# Patient Record
Sex: MALE | Race: BLACK OR AFRICAN AMERICAN | Employment: OTHER | URBAN - METROPOLITAN AREA
[De-identification: names, ages, dates, MRNs, and addresses within clinical notes are randomized per-mention and may not be internally consistent; named-entity substitution may affect disease eponyms.]

---

## 2020-01-12 ENCOUNTER — HOSPITAL ENCOUNTER (EMERGENCY)
Age: 65
Discharge: HOME | End: 2020-01-13
Attending: EMERGENCY MEDICINE
Payer: COMMERCIAL

## 2020-01-12 ENCOUNTER — APPOINTMENT (OUTPATIENT)
Dept: CT IMAGING | Age: 65
End: 2020-01-12
Payer: COMMERCIAL

## 2020-01-12 LAB
A/G RATIO: 1.5 (ref 1.1–2.2)
ALBUMIN SERPL-MCNC: 3.7 G/DL (ref 3.4–5)
ALP BLD-CCNC: 60 U/L (ref 40–129)
ALT SERPL-CCNC: 11 U/L (ref 10–40)
ANION GAP SERPL CALCULATED.3IONS-SCNC: 11 MMOL/L (ref 3–16)
AST SERPL-CCNC: 16 U/L (ref 15–37)
BASOPHILS ABSOLUTE: 0 K/UL (ref 0–0.2)
BASOPHILS RELATIVE PERCENT: 0.7 %
BILIRUB SERPL-MCNC: <0.2 MG/DL (ref 0–1)
BUN BLDV-MCNC: 15 MG/DL (ref 7–20)
CALCIUM SERPL-MCNC: 8.9 MG/DL (ref 8.3–10.6)
CHLORIDE BLD-SCNC: 103 MMOL/L (ref 99–110)
CO2: 27 MMOL/L (ref 21–32)
CREAT SERPL-MCNC: 0.9 MG/DL (ref 0.8–1.3)
EOSINOPHILS ABSOLUTE: 0.1 K/UL (ref 0–0.6)
EOSINOPHILS RELATIVE PERCENT: 2.4 %
GFR AFRICAN AMERICAN: >60
GFR NON-AFRICAN AMERICAN: >60
GLOBULIN: 2.5 G/DL
GLUCOSE BLD-MCNC: 87 MG/DL (ref 70–99)
HCT VFR BLD CALC: 43.4 % (ref 40.5–52.5)
HEMOGLOBIN: 14.1 G/DL (ref 13.5–17.5)
LIPASE: 26 U/L (ref 13–60)
LYMPHOCYTES ABSOLUTE: 1.7 K/UL (ref 1–5.1)
LYMPHOCYTES RELATIVE PERCENT: 33 %
MCH RBC QN AUTO: 27.8 PG (ref 26–34)
MCHC RBC AUTO-ENTMCNC: 32.6 G/DL (ref 31–36)
MCV RBC AUTO: 85.4 FL (ref 80–100)
MONOCYTES ABSOLUTE: 0.7 K/UL (ref 0–1.3)
MONOCYTES RELATIVE PERCENT: 14 %
NEUTROPHILS ABSOLUTE: 2.5 K/UL (ref 1.7–7.7)
NEUTROPHILS RELATIVE PERCENT: 49.9 %
PDW BLD-RTO: 14.2 % (ref 12.4–15.4)
PLATELET # BLD: 164 K/UL (ref 135–450)
PMV BLD AUTO: 7.4 FL (ref 5–10.5)
POTASSIUM REFLEX MAGNESIUM: 3.8 MMOL/L (ref 3.5–5.1)
RBC # BLD: 5.08 M/UL (ref 4.2–5.9)
SODIUM BLD-SCNC: 141 MMOL/L (ref 136–145)
TOTAL PROTEIN: 6.2 G/DL (ref 6.4–8.2)
WBC # BLD: 5.1 K/UL (ref 4–11)

## 2020-01-12 PROCEDURE — 87086 URINE CULTURE/COLONY COUNT: CPT

## 2020-01-12 PROCEDURE — 85025 COMPLETE CBC W/AUTO DIFF WBC: CPT

## 2020-01-12 PROCEDURE — 6360000004 HC RX CONTRAST MEDICATION: Performed by: EMERGENCY MEDICINE

## 2020-01-12 PROCEDURE — 36415 COLL VENOUS BLD VENIPUNCTURE: CPT

## 2020-01-12 PROCEDURE — 81001 URINALYSIS AUTO W/SCOPE: CPT

## 2020-01-12 PROCEDURE — 99284 EMERGENCY DEPT VISIT MOD MDM: CPT

## 2020-01-12 PROCEDURE — 83690 ASSAY OF LIPASE: CPT

## 2020-01-12 PROCEDURE — 80053 COMPREHEN METABOLIC PANEL: CPT

## 2020-01-12 PROCEDURE — 74177 CT ABD & PELVIS W/CONTRAST: CPT

## 2020-01-12 RX ADMIN — IOPAMIDOL 75 ML: 755 INJECTION, SOLUTION INTRAVENOUS at 23:07

## 2020-01-12 ASSESSMENT — ENCOUNTER SYMPTOMS
NAUSEA: 0
CONSTIPATION: 0
SHORTNESS OF BREATH: 0
ABDOMINAL PAIN: 1
BACK PAIN: 0
COLOR CHANGE: 0
DIARRHEA: 0
VOMITING: 0
ABDOMINAL DISTENTION: 0
BLOOD IN STOOL: 0

## 2020-01-12 ASSESSMENT — PAIN SCALES - GENERAL: PAINLEVEL_OUTOF10: 3

## 2020-01-13 VITALS
BODY MASS INDEX: 28.2 KG/M2 | TEMPERATURE: 98.2 F | SYSTOLIC BLOOD PRESSURE: 128 MMHG | OXYGEN SATURATION: 97 % | HEIGHT: 68 IN | WEIGHT: 186.07 LBS | HEART RATE: 85 BPM | DIASTOLIC BLOOD PRESSURE: 66 MMHG | RESPIRATION RATE: 18 BRPM

## 2020-01-13 LAB
BILIRUBIN URINE: NEGATIVE
BLOOD, URINE: ABNORMAL
CLARITY: CLEAR
COLOR: YELLOW
EPITHELIAL CELLS, UA: 4 /HPF (ref 0–5)
GLUCOSE URINE: NEGATIVE MG/DL
HYALINE CASTS: 1 /LPF (ref 0–8)
KETONES, URINE: NEGATIVE MG/DL
LEUKOCYTE ESTERASE, URINE: ABNORMAL
MICROSCOPIC EXAMINATION: YES
NITRITE, URINE: NEGATIVE
PH UA: 6 (ref 5–8)
PROTEIN UA: NEGATIVE MG/DL
RBC UA: 5 /HPF (ref 0–4)
SPECIFIC GRAVITY UA: 1.02 (ref 1–1.03)
URINE REFLEX TO CULTURE: YES
URINE TYPE: ABNORMAL
UROBILINOGEN, URINE: 0.2 E.U./DL
WBC UA: 55 /HPF (ref 0–5)

## 2020-01-13 PROCEDURE — 6370000000 HC RX 637 (ALT 250 FOR IP): Performed by: NURSE PRACTITIONER

## 2020-01-13 RX ORDER — CEFUROXIME AXETIL 250 MG/1
250 TABLET ORAL 2 TIMES DAILY
Qty: 14 TABLET | Refills: 0 | Status: SHIPPED | OUTPATIENT
Start: 2020-01-13 | End: 2020-01-20

## 2020-01-13 RX ORDER — CEFUROXIME AXETIL 250 MG/1
250 TABLET ORAL ONCE
Status: COMPLETED | OUTPATIENT
Start: 2020-01-13 | End: 2020-01-13

## 2020-01-13 RX ADMIN — CEFUROXIME AXETIL 250 MG: 250 TABLET ORAL at 01:05

## 2020-01-13 NOTE — ED PROVIDER NOTES
Sensation is intact. Motor: Motor function is intact. Deep Tendon Reflexes:      Reflex Scores:       Patellar reflexes are 2+ on the right side and 2+ on the left side. Psychiatric:         Mood and Affect: Mood normal.         MEDICAL DECISION MAKING    Vitals:    Vitals:    01/12/20 2052   BP: 127/83   Pulse: 64   Resp: 20   Temp: 98.4 °F (36.9 °C)   TempSrc: Oral   SpO2: 99%   Weight: 186 lb 1.1 oz (84.4 kg)   Height: 5' 8\" (1.727 m)       LABS:  Labs Reviewed   URINE RT REFLEX TO CULTURE - Abnormal; Notable for the following components:       Result Value    Blood, Urine SMALL (*)     Leukocyte Esterase, Urine MODERATE (*)     All other components within normal limits    Narrative:     Performed at:  [address] [facility] [address]   Phone [phone]   COMPREHENSIVE METABOLIC PANEL W/ REFLEX TO MG FOR LOW K - Abnormal; Notable for the following components: Total Protein 6.2 (*)     All other components within normal limits    Narrative:     Performed at:  [address] [facility] [address]   Phone [phone]   MICROSCOPIC URINALYSIS - Abnormal; Notable for the following components:    WBC, UA 55 (*)     RBC, UA 5 (*)     All other components within normal limits    Narrative:     Performed at:  [address] [facility] [address]   Phone [phone]   URINE CULTURE   CBC WITH AUTO DIFFERENTIAL    Narrative:     Performed at:  [address] [facility] [address]   Phone [phone]   LIPASE    Narrative:     Performed at:  [facility]  [address] [place] [address] [facility] [address]   Phone [phone] of labs reviewed and werenegative at this time or not returned at the time of this note.     RADIOLOGY:   Non-plain film images such as CT, Ultrasound and MRI are read by the radiologist. Ana María REICH, EL Escobar CNP have directly visualized the radiologic plain film image(s) with the below findings:        Interpretation per the Radiologist below, if available at the time of this note:    CT ABDOMEN PELVIS W IV CONTRAST Additional Contrast? None   Preliminary Result   No evidence of bowel obstruction or abnormal bowel wall thickening. No acute   bowel abnormality. Evidence of prior superior mesenteric artery stents that are patent within   limits of this exam.      No acute abnormality in the abdomen or pelvis. No results found. MEDICAL DECISION MAKING / ED COURSE:      PROCEDURES:   Procedures    None    Patient was given:  Medications   cefUROXime (CEFTIN) tablet 250 mg (has no administration in time range)   iopamidol (ISOVUE-370) 76 % injection 75 mL (75 mLs Intravenous Given 1/12/20 2307)       Differential diagnosis: Abdominal Aortic Aneurysm, Ischemic Bowel, Bowel Obstruction, Appendicitis, Diverticulitis, Pyelonephritis, UTI, STD, Ureterolithiasis, Colitis, Gonad Torsion, other    Patient seen and examined today for suprapubic abdominal pain 3-week history physical exam as above. 71year-old lying in bed in no acute distress. No abdominal tenderness. Abdomen soft without rigidity guarding or peritoneal signs. Urine shows moderate leukocytes with 55 WBCs. .  See HPI for patient presentation. Patient is hemodynamically stable, nontoxic, afebrile, and without tachycardia, tachypnea, and hypoxia. CBC and chemistry normal.  CT abdomen pelvis shows no acute abnormality. Patient treated with Ceftin. He looks well. His abdomen is benign. He is not febrile or tachycardic. He was discharged home with Ceftin in stable condition. At this time, the evidence for any other entities in the differential is insufficient to justify any further testing. This was explained to the patient.   The patient was advised Paul Dykes, EL - CNP,           Sasha Jonas, EL - CNP  01/13/20 3473

## 2020-01-14 LAB
ORGANISM: ABNORMAL
URINE CULTURE, ROUTINE: ABNORMAL

## 2021-05-09 ENCOUNTER — HOSPITAL ENCOUNTER (EMERGENCY)
Age: 66
Discharge: HOME | End: 2021-05-09
Attending: EMERGENCY MEDICINE
Payer: OTHER GOVERNMENT

## 2021-05-09 ENCOUNTER — APPOINTMENT (OUTPATIENT)
Dept: CT IMAGING | Age: 66
End: 2021-05-09
Payer: OTHER GOVERNMENT

## 2021-05-09 VITALS
BODY MASS INDEX: 35.92 KG/M2 | DIASTOLIC BLOOD PRESSURE: 99 MMHG | HEIGHT: 67 IN | SYSTOLIC BLOOD PRESSURE: 149 MMHG | WEIGHT: 228.84 LBS | RESPIRATION RATE: 14 BRPM | TEMPERATURE: 98.1 F | OXYGEN SATURATION: 99 % | HEART RATE: 81 BPM

## 2021-05-09 DIAGNOSIS — R10.9 ABDOMINAL PAIN, UNSPECIFIED ABDOMINAL LOCATION: Primary | ICD-10-CM

## 2021-05-09 DIAGNOSIS — R11.2 NON-INTRACTABLE VOMITING WITH NAUSEA, UNSPECIFIED VOMITING TYPE: ICD-10-CM

## 2021-05-09 LAB
ALBUMIN SERPL-MCNC: 4.5 G/DL (ref 3.4–5)
ALP BLD-CCNC: 87 U/L (ref 40–129)
ALT SERPL-CCNC: 22 U/L (ref 10–40)
AMPHETAMINE SCREEN, URINE: ABNORMAL
ANION GAP SERPL CALCULATED.3IONS-SCNC: 15 MMOL/L (ref 3–16)
AST SERPL-CCNC: 39 U/L (ref 15–37)
BARBITURATE SCREEN URINE: ABNORMAL
BASOPHILS ABSOLUTE: 0 K/UL (ref 0–0.2)
BASOPHILS RELATIVE PERCENT: 0.5 %
BENZODIAZEPINE SCREEN, URINE: ABNORMAL
BILIRUB SERPL-MCNC: 0.5 MG/DL (ref 0–1)
BILIRUBIN DIRECT: <0.2 MG/DL (ref 0–0.3)
BILIRUBIN URINE: ABNORMAL
BILIRUBIN, INDIRECT: ABNORMAL MG/DL (ref 0–1)
BLOOD, URINE: ABNORMAL
BUN BLDV-MCNC: 13 MG/DL (ref 7–20)
CALCIUM SERPL-MCNC: 9.3 MG/DL (ref 8.3–10.6)
CANNABINOID SCREEN URINE: POSITIVE
CHLORIDE BLD-SCNC: 103 MMOL/L (ref 99–110)
CLARITY: CLEAR
CO2: 25 MMOL/L (ref 21–32)
COCAINE METABOLITE SCREEN URINE: ABNORMAL
COLOR: ABNORMAL
CREAT SERPL-MCNC: 1.1 MG/DL (ref 0.8–1.3)
EOSINOPHILS ABSOLUTE: 0.2 K/UL (ref 0–0.6)
EOSINOPHILS RELATIVE PERCENT: 2.6 %
EPITHELIAL CELLS, UA: 2 /HPF (ref 0–5)
ETHANOL: NORMAL MG/DL (ref 0–0.08)
GFR AFRICAN AMERICAN: >60
GFR NON-AFRICAN AMERICAN: >60
GLUCOSE BLD-MCNC: 62 MG/DL (ref 70–99)
GLUCOSE URINE: NEGATIVE MG/DL
HCT VFR BLD CALC: 50.8 % (ref 40.5–52.5)
HEMOGLOBIN: 16.7 G/DL (ref 13.5–17.5)
HYALINE CASTS: 1 /LPF (ref 0–8)
KETONES, URINE: >=80 MG/DL
LACTIC ACID: 1.2 MMOL/L (ref 0.4–2)
LEUKOCYTE ESTERASE, URINE: NEGATIVE
LIPASE: 13 U/L (ref 13–60)
LYMPHOCYTES ABSOLUTE: 1.4 K/UL (ref 1–5.1)
LYMPHOCYTES RELATIVE PERCENT: 23.9 %
Lab: ABNORMAL
MCH RBC QN AUTO: 28 PG (ref 26–34)
MCHC RBC AUTO-ENTMCNC: 32.8 G/DL (ref 31–36)
MCV RBC AUTO: 85.5 FL (ref 80–100)
METHADONE SCREEN, URINE: ABNORMAL
MICROSCOPIC EXAMINATION: YES
MONOCYTES ABSOLUTE: 0.8 K/UL (ref 0–1.3)
MONOCYTES RELATIVE PERCENT: 14.3 %
NEUTROPHILS ABSOLUTE: 3.5 K/UL (ref 1.7–7.7)
NEUTROPHILS RELATIVE PERCENT: 58.7 %
NITRITE, URINE: NEGATIVE
OCCULT BLOOD DIAGNOSTIC: NORMAL
OPIATE SCREEN URINE: ABNORMAL
OXYCODONE URINE: ABNORMAL
PDW BLD-RTO: 15.4 % (ref 12.4–15.4)
PH UA: 5.5 (ref 5–8)
PH UA: 6
PHENCYCLIDINE SCREEN URINE: ABNORMAL
PLATELET # BLD: 157 K/UL (ref 135–450)
PMV BLD AUTO: 7.9 FL (ref 5–10.5)
POTASSIUM REFLEX MAGNESIUM: 3.8 MMOL/L (ref 3.5–5.1)
PROPOXYPHENE SCREEN: ABNORMAL
PROTEIN UA: 30 MG/DL
RBC # BLD: 5.94 M/UL (ref 4.2–5.9)
RBC UA: 7 /HPF (ref 0–4)
SODIUM BLD-SCNC: 143 MMOL/L (ref 136–145)
SPECIFIC GRAVITY UA: 1.02 (ref 1–1.03)
TOTAL PROTEIN: 7.5 G/DL (ref 6.4–8.2)
URINE REFLEX TO CULTURE: ABNORMAL
URINE TYPE: ABNORMAL
UROBILINOGEN, URINE: 1 E.U./DL
WBC # BLD: 5.9 K/UL (ref 4–11)
WBC UA: 1 /HPF (ref 0–5)

## 2021-05-09 PROCEDURE — 96374 THER/PROPH/DIAG INJ IV PUSH: CPT

## 2021-05-09 PROCEDURE — 82077 ASSAY SPEC XCP UR&BREATH IA: CPT

## 2021-05-09 PROCEDURE — 74174 CTA ABD&PLVS W/CONTRAST: CPT

## 2021-05-09 PROCEDURE — 2580000003 HC RX 258: Performed by: EMERGENCY MEDICINE

## 2021-05-09 PROCEDURE — 99283 EMERGENCY DEPT VISIT LOW MDM: CPT

## 2021-05-09 PROCEDURE — 80307 DRUG TEST PRSMV CHEM ANLYZR: CPT

## 2021-05-09 PROCEDURE — 80048 BASIC METABOLIC PNL TOTAL CA: CPT

## 2021-05-09 PROCEDURE — 6360000004 HC RX CONTRAST MEDICATION: Performed by: EMERGENCY MEDICINE

## 2021-05-09 PROCEDURE — 6360000002 HC RX W HCPCS: Performed by: EMERGENCY MEDICINE

## 2021-05-09 PROCEDURE — 80076 HEPATIC FUNCTION PANEL: CPT

## 2021-05-09 PROCEDURE — 81001 URINALYSIS AUTO W/SCOPE: CPT

## 2021-05-09 PROCEDURE — G0328 FECAL BLOOD SCRN IMMUNOASSAY: HCPCS

## 2021-05-09 PROCEDURE — 83690 ASSAY OF LIPASE: CPT

## 2021-05-09 PROCEDURE — 96372 THER/PROPH/DIAG INJ SC/IM: CPT

## 2021-05-09 PROCEDURE — 83605 ASSAY OF LACTIC ACID: CPT

## 2021-05-09 PROCEDURE — 2500000003 HC RX 250 WO HCPCS: Performed by: EMERGENCY MEDICINE

## 2021-05-09 PROCEDURE — 85025 COMPLETE CBC W/AUTO DIFF WBC: CPT

## 2021-05-09 RX ORDER — LIDOCAINE HYDROCHLORIDE 10 MG/ML
0.9 INJECTION, SOLUTION EPIDURAL; INFILTRATION; INTRACAUDAL; PERINEURAL ONCE
Status: COMPLETED | OUTPATIENT
Start: 2021-05-09 | End: 2021-05-09

## 2021-05-09 RX ORDER — SODIUM CHLORIDE 9 MG/ML
1000 INJECTION, SOLUTION INTRAVENOUS CONTINUOUS
Status: DISCONTINUED | OUTPATIENT
Start: 2021-05-09 | End: 2021-05-09 | Stop reason: HOSPADM

## 2021-05-09 RX ORDER — OMEPRAZOLE 40 MG/1
40 CAPSULE, DELAYED RELEASE ORAL
Qty: 30 CAPSULE | Refills: 0 | Status: SHIPPED | OUTPATIENT
Start: 2021-05-09

## 2021-05-09 RX ORDER — ONDANSETRON 2 MG/ML
4 INJECTION INTRAMUSCULAR; INTRAVENOUS ONCE
Status: COMPLETED | OUTPATIENT
Start: 2021-05-09 | End: 2021-05-09

## 2021-05-09 RX ORDER — DOXYCYCLINE HYCLATE 100 MG/1
100 CAPSULE ORAL 2 TIMES DAILY
Qty: 20 CAPSULE | Refills: 0 | Status: SHIPPED | OUTPATIENT
Start: 2021-05-09 | End: 2021-05-19

## 2021-05-09 RX ORDER — ONDANSETRON 4 MG/1
4 TABLET, ORALLY DISINTEGRATING ORAL 4 TIMES DAILY PRN
Qty: 30 TABLET | Refills: 1 | Status: SHIPPED | OUTPATIENT
Start: 2021-05-09

## 2021-05-09 RX ORDER — CEFTRIAXONE SODIUM 250 MG/1
250 INJECTION, POWDER, FOR SOLUTION INTRAMUSCULAR; INTRAVENOUS ONCE
Status: COMPLETED | OUTPATIENT
Start: 2021-05-09 | End: 2021-05-09

## 2021-05-09 RX ADMIN — SODIUM CHLORIDE, SODIUM LACTATE, POTASSIUM CHLORIDE, CALCIUM CHLORIDE AND DEXTROSE MONOHYDRATE 1000 ML: 5; 600; 310; 30; 20 INJECTION, SOLUTION INTRAVENOUS at 11:27

## 2021-05-09 RX ADMIN — ONDANSETRON 4 MG: 2 INJECTION INTRAMUSCULAR; INTRAVENOUS at 09:03

## 2021-05-09 RX ADMIN — IOPAMIDOL 75 ML: 755 INJECTION, SOLUTION INTRAVENOUS at 10:12

## 2021-05-09 RX ADMIN — CEFTRIAXONE SODIUM 250 MG: 250 INJECTION, POWDER, FOR SOLUTION INTRAMUSCULAR; INTRAVENOUS at 14:51

## 2021-05-09 RX ADMIN — SODIUM CHLORIDE 1000 ML: 9 INJECTION, SOLUTION INTRAVENOUS at 09:03

## 2021-05-09 RX ADMIN — LIDOCAINE HYDROCHLORIDE 0.9 ML: 10 INJECTION, SOLUTION EPIDURAL; INFILTRATION; INTRACAUDAL; PERINEURAL at 14:51

## 2021-05-09 ASSESSMENT — ENCOUNTER SYMPTOMS
RHINORRHEA: 0
DIARRHEA: 0
SHORTNESS OF BREATH: 1
VOMITING: 1
EYE PAIN: 0
WHEEZING: 0
EYE DISCHARGE: 0
BACK PAIN: 0
COUGH: 1
SORE THROAT: 0
ABDOMINAL PAIN: 1
BLOOD IN STOOL: 1
EYE REDNESS: 0
NAUSEA: 1

## 2021-05-09 NOTE — ED PROVIDER NOTES
for appetite change. Negative for chills, fatigue and fever. HENT: Negative for ear pain, rhinorrhea and sore throat. Eyes: Negative for pain, discharge, redness and visual disturbance. Respiratory: Positive for cough and shortness of breath. Negative for wheezing. Cardiovascular: Negative for chest pain, palpitations and leg swelling. Gastrointestinal: Positive for abdominal pain, blood in stool, nausea and vomiting. Negative for diarrhea. Patient also has a chronic right inguinal hernia. Genitourinary: Negative for difficulty urinating and dysuria. Musculoskeletal: Negative for arthralgias, back pain and myalgias. Skin: Negative for rash. Allergic/Immunologic: Negative for environmental allergies. Neurological: Negative for dizziness, seizures, syncope and headaches. Hematological: Negative for adenopathy. Psychiatric/Behavioral: Negative for suicidal ideas. The patient is not nervous/anxious. PAST MEDICAL HISTORY     Past Medical History:   Diagnosis Date    Arthritis     degenerative    Hypertension     Neuromuscular disorder (HCC)     numbness left foot    PTSD (post-traumatic stress disorder)     [facility]     Social anxiety disorder          SURGICAL HISTORY     Past Surgical History:   Procedure Laterality Date    EYE SURGERY Right          CURRENTMEDICATIONS       Previous Medications    ASPIRIN 81 MG CHEWABLE TABLET    Take 1 tablet by mouth daily. CLOPIDOGREL (PLAVIX) 75 MG TABLET    Take 1 tablet by mouth daily. LISINOPRIL-HYDROCHLOROTHIAZIDE (PRINZIDE;ZESTORETIC) 20-12.5 MG PER TABLET    Take 1 tablet by mouth daily. ALLERGIES     Patient has no known allergies. FAMILY HISTORY     History reviewed. No pertinent family history.        SOCIAL HISTORY       Social History     Socioeconomic History    Marital status: Single     Spouse name: None    Number of children: None    Years of education: None    Highest education level: None   Occupational History    None   Social Needs    Financial resource strain: None    Food insecurity     Worry: None     Inability: None    Transportation needs     Medical: None     Non-medical: None   Tobacco Use    Smoking status: Current Every Day Smoker     Packs/day: 1.00     Years: 14.00     Pack years: 14.00     Types: Cigarettes   Substance and Sexual Activity    Alcohol use: Yes     Comment: rarely, but had alcohol on Wednesday.  Drug use: Yes     Types: Cocaine     Comment: used Cocaine last week.  Sexual activity: None   Lifestyle    Physical activity     Days per week: None     Minutes per session: None    Stress: None   Relationships    Social connections     Talks on phone: None     Gets together: None     Attends Faith service: None     Active member of club or organization: None     Attends meetings of clubs or organizations: None     Relationship status: None    Intimate partner violence     Fear of current or ex partner: None     Emotionally abused: None     Physically abused: None     Forced sexual activity: None   Other Topics Concern    None   Social History Narrative    None       SCREENINGS             PHYSICAL EXAM    (up to 7 for level 4, 8 or more for level 5)     ED Triage Vitals [05/09/21 0818]   BP Temp Temp Source Pulse Resp SpO2 Height Weight   (!) 149/106 98.1 °F (36.7 °C) Oral 105 12 96 % 5' 7\" (1.702 m) 228 lb 13.4 oz (103.8 kg)      height is 5' 7\" (1.702 m) and weight is 228 lb 13.4 oz (103.8 kg). His oral temperature is 98.1 °F (36.7 °C). His blood pressure is 149/99 (abnormal) and his pulse is 81. His respiration is 14 and oxygen saturation is 99%. Physical Exam  Constitutional:       Appearance: He is well-developed. He is not diaphoretic. HENT:      Head: Normocephalic and atraumatic. Right Ear: External ear normal.      Left Ear: External ear normal.   Eyes:      General: No scleral icterus. Right eye: No discharge.          Left eye: No discharge. Neck:      Musculoskeletal: Normal range of motion. Thyroid: No thyromegaly. Vascular: No JVD. Trachea: No tracheal deviation. Cardiovascular:      Rate and Rhythm: Normal rate and regular rhythm. Heart sounds: No murmur. No friction rub. No gallop. Pulmonary:      Effort: Pulmonary effort is normal. No respiratory distress. Breath sounds: Normal breath sounds. No stridor. No wheezing or rales. Abdominal:      General: There is no distension. Palpations: Abdomen is soft. Tenderness: There is abdominal tenderness in the periumbilical area. There is no guarding or rebound. Genitourinary:     Comments: Patient has a fairly large right scrotal hernia that is reducible. Rectal exam showed normal tone. Stool was yellow and Hemoccult came back negative from the lab. Musculoskeletal:         General: No tenderness. Right lower le+ Pitting Edema present. Left lower le+ Pitting Edema present. Skin:     General: Skin is warm and dry. Findings: No rash (On exposed body surfaces). Neurological:      Mental Status: He is alert and oriented to person, place, and time. Coordination: Coordination normal.   Psychiatric:         Attention and Perception: Attention normal.         Mood and Affect: Mood normal.         Speech: Speech is rapid and pressured. Behavior: Behavior normal.         Thought Content: Thought content normal.         Judgment: Judgment is inappropriate.          DIAGNOSTIC RESULTS   LABS:    Results for orders placed or performed during the hospital encounter of 21   CBC Auto Differential   Result Value Ref Range    WBC 5.9 4.0 - 11.0 K/uL    RBC 5.94 (H) 4.20 - 5.90 M/uL    Hemoglobin 16.7 13.5 - 17.5 g/dL    Hematocrit 50.8 40.5 - 52.5 %    MCV 85.5 80.0 - 100.0 fL    MCH 28.0 26.0 - 34.0 pg    MCHC 32.8 31.0 - 36.0 g/dL    RDW 15.4 12.4 - 15.4 %    Platelets 137 280 - 443 K/uL    MPV 7.9 5.0 - 10.5 fL    Neutrophils % 58.7 %    Lymphocytes % 23.9 %    Monocytes % 14.3 %    Eosinophils % 2.6 %    Basophils % 0.5 %    Neutrophils Absolute 3.5 1.7 - 7.7 K/uL    Lymphocytes Absolute 1.4 1.0 - 5.1 K/uL    Monocytes Absolute 0.8 0.0 - 1.3 K/uL    Eosinophils Absolute 0.2 0.0 - 0.6 K/uL    Basophils Absolute 0.0 0.0 - 0.2 K/uL   Basic Metabolic Panel w/ Reflex to MG   Result Value Ref Range    Sodium 143 136 - 145 mmol/L    Potassium reflex Magnesium 3.8 3.5 - 5.1 mmol/L    Chloride 103 99 - 110 mmol/L    CO2 25 21 - 32 mmol/L    Anion Gap 15 3 - 16    Glucose 62 (L) 70 - 99 mg/dL    BUN 13 7 - 20 mg/dL    CREATININE 1.1 0.8 - 1.3 mg/dL    GFR Non-African American >60 >60    GFR African American >60 >60    Calcium 9.3 8.3 - 10.6 mg/dL   Hepatic Function Panel   Result Value Ref Range    Total Protein 7.5 6.4 - 8.2 g/dL    Albumin 4.5 3.4 - 5.0 g/dL    Alkaline Phosphatase 87 40 - 129 U/L    ALT 22 10 - 40 U/L    AST 39 (H) 15 - 37 U/L    Total Bilirubin 0.5 0.0 - 1.0 mg/dL    Bilirubin, Direct <0.2 0.0 - 0.3 mg/dL    Bilirubin, Indirect see below 0.0 - 1.0 mg/dL   Lipase   Result Value Ref Range    Lipase 13.0 13.0 - 60.0 U/L   Lactic Acid, Plasma   Result Value Ref Range    Lactic Acid 1.2 0.4 - 2.0 mmol/L   Urinalysis Reflex to Culture    Specimen: Urine, clean catch   Result Value Ref Range    Color, UA DK YELLOW Straw/Yellow    Clarity, UA Clear Clear    Glucose, Ur Negative Negative mg/dL    Bilirubin Urine SMALL (A) Negative    Ketones, Urine >=80 (A) Negative mg/dL    Specific Gravity, UA 1.024 1.005 - 1.030    Blood, Urine LARGE (A) Negative    pH, UA 5.5 5.0 - 8.0    Protein, UA 30 (A) Negative mg/dL    Urobilinogen, Urine 1.0 <2.0 E.U./dL    Nitrite, Urine Negative Negative    Leukocyte Esterase, Urine Negative Negative    Microscopic Examination YES     Urine Type NotGiven     Urine Reflex to Culture Not Indicated    Blood Occult Stool #1   Result Value Ref Range    Occult Blood Diagnostic Result: Negative  Normal range: Negative      Ethanol   Result Value Ref Range    Ethanol Lvl None Detected mg/dL   Drug screen multi urine   Result Value Ref Range    Amphetamine Screen, Urine Neg Negative <1000ng/mL    Barbiturate Screen, Ur Neg Negative <200 ng/mL    Benzodiazepine Screen, Urine Neg Negative <200 ng/mL    Cannabinoid Scrn, Ur POSITIVE (A) Negative <50 ng/mL    Cocaine Metabolite Screen, Urine Neg Negative <300 ng/mL    Opiate Scrn, Ur Neg Negative <300 ng/mL    PCP Screen, Urine Neg Negative <25 ng/mL    Methadone Screen, Urine Neg Negative <300 ng/mL    Propoxyphene Scrn, Ur Neg Negative <300 ng/mL    Oxycodone Urine Neg Negative <100 ng/ml    pH, UA 6.0     Drug Screen Comment: see below    Microscopic Urinalysis   Result Value Ref Range    Hyaline Casts, UA 1 0 - 8 /LPF    WBC, UA 1 0 - 5 /HPF    RBC, UA 7 (H) 0 - 4 /HPF    Epithelial Cells, UA 2 0 - 5 /HPF       All other labs were within normal range or not returned as of this dictation. EKG: All EKG's are interpreted by the Emergency Department Physician who either signs orCo-signs this chart in the absence of a cardiologist.    None    RADIOLOGY:   Non-plain film images such as CT, Ultrasound and MRI are read by the radiologist. [facility] [place] radiographic images are visualized and preliminarily interpreted by the  EDProvider with the below findings:    Cta Chest Abdomen Pelvis W Contrast    Result Date: 5/9/2021  EXAMINATION: CTA OF THE CHEST, ABDOMEN AND PELVIS WITH CONTRAST 5/9/2021 10:11 am: TECHNIQUE: CTA of the chest, abdomen and pelvis was performed after the administration of intravenous contrast.  Multiplanar reformatted images are provided for review. MIP images are provided for review. Dose modulation, iterative reconstruction, and/or weight based adjustment of the mA/kV was utilized to reduce the radiation dose to as low as reasonably achievable. COMPARISON: January 12, 2020, March 1, 2015 CT of the abdomen and pelvis.   No comparison for the chest portion of the exam. HISTORY: ORDERING SYSTEM PROVIDED HISTORY: Cough hemoptysis abdominal pain nausea and vomiting. History of SMA stenting and previous mesenteric ischemia TECHNOLOGIST PROVIDED HISTORY: Reason for exam:->Cough hemoptysis abdominal pain nausea and vomiting. History of SMA stenting and previous mesenteric ischemia Decision Support Exception - unselect if not a suspected or confirmed emergency medical condition->Emergency Medical Condition (MA) Reason for Exam: Cough hemoptysis abdominal pain nausea and vomiting. History of SMA stenting and previous mesenteric ischemia Acuity: Chronic Type of Exam: Ongoing FINDINGS: CTA CHEST: Thoracic aorta: Thoracic aorta opacifies normally and is of normal caliber. Mediastinum: Heart size is normal.  No pericardial effusion. No hilar or mediastinal adenopathy. There are coarsely calcified left hilar nodes and mediastinal nodes. Lungs/Pleura: Bibasilar dependent atelectasis. The central tracheal bronchial airways are normal. There is no bronchiectasis or bronchial wall thickening. There is no focal pulmonary consolidation, pneumothorax, or pleural effusion. Soft Tissues/Bones: No aggressive lytic or blastic bony lesion. CTA ABDOMEN: Abdominal aorta/Branches: Abdominal aorta opacifies normally. Celiac trunk opacifies normally. There are 2 stents in the SMA. SMA opacifies normally to the secondary branches. Organs: Hepatic steatosis. Gallbladder, spleen, pancreas, and adrenal glands are unremarkable. Kidneys enhance symmetrically. No hydronephrosis. GI/Bowel: Cecal and small bowel containing right inguinal hernia with a small amount of fluid in the hernia sac. No appreciable bowel wall thickening or evidence of bowel obstruction. Sigmoid diverticulosis. No evidence of free intraperitoneal air. Peritoneum/Retroperitoneum: No retroperitoneal hematoma or adenopathy. Bones/Soft Tissues: No aggressive lytic or blastic bony lesion.   Mild L5-S1 degenerative disc disease. CTA PELVIS: Aorta/Iliacs: Common iliac arteries, internal iliac arteries, and external iliac arteries opacify normally. Other: Bladder is unremarkable. Prostate is enlarged. Calcifications in the pelvis are phleboliths. No free fluid in the pelvis. Bones/Soft Tissues: No aggressive lytic or blastic bony lesion. Severe bilateral hip degenerative changes with bone-on-bone articulation and flattening of the femoral head articular surface. 1. No evidence of thoracic aortic aneurysm or dissection. 2. Normal opacification of the major side branches of the aorta. There are 2 stents in the SMA. No visible thrombus to the secondary branches. 3. Cecum and small bowel containing right inguinal hernia with small amount of fluid in the hernia sac. There is no associated bowel wall thickening or evidence of bowel obstruction. 4. Hepatic steatosis. 5. Severe bilateral hip degenerative changes with bone-on-bone articulation and flattening of the femoral head articular surface. 6. Right base pulmonary atelectasis. No other acute pulmonary finding to account for patient's hemoptysis.            PROCEDURES   Unless otherwise noted below, none     Procedures    CRITICAL CARE TIME   N/A    CONSULTS:  None    EMERGENCY DEPARTMENT COURSE and DIFFERENTIAL DIAGNOSIS/MDM:   Vitals:    Vitals:    05/09/21 0930 05/09/21 0945 05/09/21 1115 05/09/21 1315   BP: (!) 168/108 (!) 156/119 (!) 156/108 (!) 149/99   Pulse: 87  82 81   Resp: 14  16 14   Temp:       TempSrc:       SpO2: 99%  98% 99%   Weight:       Height:           Patient was given the following medications:  Medications   0.9 % sodium chloride infusion (0 mLs Intravenous Stopped 5/9/21 1117)   cefTRIAXone (ROCEPHIN) injection 250 mg (has no administration in time range)     And   lidocaine PF 1 % injection 0.9 mL (has no administration in time range)   ondansetron (ZOFRAN) injection 4 mg (4 mg Intravenous Given 5/9/21 0903)   iopamidol (ISOVUE-370) 76 % injection 75 mL (75 mLs Intravenous Given 5/9/21 1012)   dextrose 5 % in lactated ringers bolus (0 mLs Intravenous Stopped 5/9/21 1318)       There is actually several reasons for this patient to have gastrointestinal symptoms other than his history of mesenteric ischemia. First of course is the fact that he is a daily drinker. Second also is that he is a daily user of marijuana. Lastly he has been on Flagyl twice and he continues to drink so there is also the possibility of that disulfiram-like reaction associated with Flagyl and alcohol use simultaneously. Regardless we gave him 2 L of fluid one of them being D5 Ringer's lactate. Although his BUN and creatinine were within normal range she did have a lot of ketones in the urine. He was given Zofran. We then fed the patient and he kept that down and feels a lot better. He insists he wants a shot of penicillin for his trichomonas. I explained to him that that is not the treatment for trichomonas and then all this is started to come together with with multiple possibilities and etiologies as to his gastrointestinal symptoms that are unrelated to ischemia. Going to put the patient on a course of doxycycline and will give him a shot of Rocephin here to cover gonorrhea and chlamydia. At the moment is probably best that we hold off on giving him additional metronidazole. He states he has had 2 courses of it. FINAL IMPRESSION      1. Abdominal pain, unspecified abdominal location    2.  Non-intractable vomiting with nausea, unspecified vomiting type          DISPOSITION/PLAN    DISPOSITION Discharge - Pending Orders Complete 05/09/2021 12:12:49 PM      PATIENT REFERRED TO:  Katherine Carroll MD  [address] [place] [address]  [phone]            DISCHARGE MEDICATIONS:  New Prescriptions    DOXYCYCLINE HYCLATE (VIBRAMYCIN) 100 MG CAPSULE    Take 1 capsule by mouth 2 times daily for 10 days    OMEPRAZOLE (PRILOSEC) 40 MG DELAYED

## 2021-05-09 NOTE — ED NOTES
Patient given turkey sandwich and pepsi. No c/o nausea at this time. Call light within reach, jessy any further needs at this time.      Cheyanne Garcia, NATALYA  05/09/21 4313

## 2022-03-01 ENCOUNTER — HOSPITAL ENCOUNTER (EMERGENCY)
Age: 67
Discharge: HOME | End: 2022-03-01
Payer: COMMERCIAL

## 2022-03-01 VITALS
TEMPERATURE: 98.78 F | HEART RATE: 81 BPM | DIASTOLIC BLOOD PRESSURE: 94 MMHG | RESPIRATION RATE: 18 BRPM | OXYGEN SATURATION: 98 % | SYSTOLIC BLOOD PRESSURE: 168 MMHG

## 2022-03-01 VITALS
RESPIRATION RATE: 18 BRPM | SYSTOLIC BLOOD PRESSURE: 165 MMHG | DIASTOLIC BLOOD PRESSURE: 60 MMHG | HEART RATE: 78 BPM | OXYGEN SATURATION: 97 %

## 2022-03-01 VITALS — BODY MASS INDEX: 31.3 KG/M2

## 2022-03-01 DIAGNOSIS — I10: ICD-10-CM

## 2022-03-01 DIAGNOSIS — F17.210: ICD-10-CM

## 2022-03-01 DIAGNOSIS — F14.10: ICD-10-CM

## 2022-03-01 DIAGNOSIS — Z86.16: ICD-10-CM

## 2022-03-01 DIAGNOSIS — R60.0: ICD-10-CM

## 2022-03-01 DIAGNOSIS — M79.10: Primary | ICD-10-CM

## 2022-03-01 DIAGNOSIS — Z79.899: ICD-10-CM

## 2022-03-01 LAB
ALANINE AMINOTRANSFER ALT/SGPT: 14 U/L (ref 16–61)
ALBUMIN SERPL-MCNC: 3.1 G/DL (ref 3.2–5)
ALCOHOL, BLOOD (MEDICAL)-SERUM: < 3 MG/DL
ALKALINE PHOSPHATASE: 78 U/L (ref 45–117)
AMPHET UR-MCNC: NEGATIVE NG/ML
ANION GAP: 6 (ref 5–15)
AST(SGOT): 17 U/L (ref 15–37)
BARBITURATE URINE VISTA: NEGATIVE
BENZODIAZEPINE URINE VISTA: NEGATIVE
BUN SERPL-MCNC: 14 MG/DL (ref 7–18)
BUN/CREAT RATIO: 12.5 RATIO (ref 10–20)
CALCIUM SERPL-MCNC: 8.8 MG/DL (ref 8.5–10.1)
CARBON DIOXIDE: 29 MMOL/L (ref 21–32)
CHLORIDE: 108 MMOL/L (ref 98–107)
COCAINE URINE VISTA: POSITIVE
DEPRECATED RDW RBC: 45.9 FL (ref 35.1–43.9)
DRUG CONFIRMATION TO FOLLOW?: (no result)
ECSTACY URINE VISTA: NEGATIVE
ERYTHROCYTE [DISTWIDTH] IN BLOOD: 14.2 % (ref 11.6–14.6)
EST GLOM FILT RATE - AFR AMER: 84 ML/MIN (ref 60–?)
ESTIMATED CREATININE CLEARANCE: 59.84 ML/MIN
GLOBULIN: 2.9 G/DL (ref 2.2–4.2)
GLUCOSE: 96 MG/DL (ref 74–106)
HCT VFR BLD AUTO: 41.6 % (ref 40–54)
HEMOGLOBIN: 13.4 G/DL (ref 13–16.5)
HGB BLD-MCNC: 13.4 G/DL (ref 13–16.5)
IMMATURE GRANULOCYTES COUNT: 0.01 X10^3/UL (ref 0–0)
KETONE-DIPSTICK: 5 MG/DL
LEUKOCYTE ESTERASE UR QL STRIP: 25 /UL
MCV RBC: 87 FL (ref 80–94)
MEAN CORP HGB CONC: 32.2 G/DL (ref 32–36)
MEAN PLATELET VOL.: 9.6 FL (ref 6.2–12)
METHADONE URINE VISTA: NEGATIVE
MUCOUS THREADS URNS QL MICRO: (no result) /HPF
NRBC FLAGGED BY ANALYZER: 0 % (ref 0–5)
PCP UR QL: NEGATIVE
PH UR: 5 [PH]
PLATELET # BLD: 157 K/MM3 (ref 150–450)
PLATELET COUNT: 157 K/MM3 (ref 150–450)
POTASSIUM: 3.6 MMOL/L (ref 3.5–5.1)
PROT UR QL STRIP.AUTO: 30 MG/DL
RBC # BLD AUTO: 4.78 M/MM3 (ref 4.6–6.2)
RBC DISTRIBUTION WIDTH CV: 14.2 % (ref 11.6–14.6)
RBC DISTRIBUTION WIDTH SD: 45.9 FL (ref 35.1–43.9)
RBC UR QL: (no result) /HPF (ref 0–5)
RBC UR QL: 150 /UL
SP GR UR: 1.02 (ref 1–1.03)
SQUAMOUS URNS QL MICRO: (no result) /HPF (ref 0–5)
THC URINE VISTA: NEGATIVE
URINE PRESERVATIVE: (no result)
WBC # BLD AUTO: 5.2 K/MM3 (ref 4.4–11)
WHITE BLOOD COUNT: 5.2 K/MM3 (ref 4.4–11)

## 2022-03-01 PROCEDURE — 80307 DRUG TEST PRSMV CHEM ANLYZR: CPT

## 2022-03-01 PROCEDURE — 85025 COMPLETE CBC W/AUTO DIFF WBC: CPT

## 2022-03-01 PROCEDURE — 82077 ASSAY SPEC XCP UR&BREATH IA: CPT

## 2022-03-01 PROCEDURE — A4216 STERILE WATER/SALINE, 10 ML: HCPCS

## 2022-03-01 PROCEDURE — 96361 HYDRATE IV INFUSION ADD-ON: CPT

## 2022-03-01 PROCEDURE — 99285 EMERGENCY DEPT VISIT HI MDM: CPT

## 2022-03-01 PROCEDURE — 96374 THER/PROPH/DIAG INJ IV PUSH: CPT

## 2022-03-01 PROCEDURE — 87426 SARSCOV CORONAVIRUS AG IA: CPT

## 2022-03-01 PROCEDURE — 81001 URINALYSIS AUTO W/SCOPE: CPT

## 2022-03-01 PROCEDURE — 80053 COMPREHEN METABOLIC PANEL: CPT

## 2022-03-02 ENCOUNTER — APPOINTMENT (OUTPATIENT)
Dept: GENERAL RADIOLOGY | Age: 67
End: 2022-03-02
Payer: OTHER GOVERNMENT

## 2022-03-02 ENCOUNTER — HOSPITAL ENCOUNTER (EMERGENCY)
Age: 67
Discharge: HOME | End: 2022-03-02
Attending: EMERGENCY MEDICINE
Payer: OTHER GOVERNMENT

## 2022-03-02 VITALS
TEMPERATURE: 98 F | OXYGEN SATURATION: 99 % | RESPIRATION RATE: 18 BRPM | HEART RATE: 88 BPM | SYSTOLIC BLOOD PRESSURE: 170 MMHG | DIASTOLIC BLOOD PRESSURE: 105 MMHG

## 2022-03-02 VITALS
WEIGHT: 216.49 LBS | RESPIRATION RATE: 16 BRPM | TEMPERATURE: 98.1 F | HEART RATE: 70 BPM | SYSTOLIC BLOOD PRESSURE: 182 MMHG | OXYGEN SATURATION: 97 % | DIASTOLIC BLOOD PRESSURE: 101 MMHG | BODY MASS INDEX: 33.91 KG/M2

## 2022-03-02 DIAGNOSIS — M79.605 BILATERAL LEG PAIN: Primary | ICD-10-CM

## 2022-03-02 DIAGNOSIS — M79.604 BILATERAL LEG PAIN: Primary | ICD-10-CM

## 2022-03-02 DIAGNOSIS — F14.10 NONDEPENDENT COCAINE ABUSE (HCC): Primary | ICD-10-CM

## 2022-03-02 DIAGNOSIS — R31.9 HEMATURIA, UNSPECIFIED TYPE: ICD-10-CM

## 2022-03-02 LAB
ACETAMINOPHEN LEVEL: <5 UG/ML (ref 10–30)
AMPHETAMINE SCREEN, URINE: ABNORMAL
ANION GAP SERPL CALCULATED.3IONS-SCNC: 12 MMOL/L (ref 3–16)
BARBITURATE SCREEN URINE: ABNORMAL
BASOPHILS ABSOLUTE: 0 K/UL (ref 0–0.2)
BASOPHILS RELATIVE PERCENT: 0.6 %
BENZODIAZEPINE SCREEN, URINE: ABNORMAL
BILIRUBIN URINE: NEGATIVE
BILIRUBIN URINE: NEGATIVE
BLOOD, URINE: ABNORMAL
BLOOD, URINE: ABNORMAL
BUN BLDV-MCNC: 14 MG/DL (ref 7–20)
CALCIUM SERPL-MCNC: 8.6 MG/DL (ref 8.3–10.6)
CANNABINOID SCREEN URINE: ABNORMAL
CHLORIDE BLD-SCNC: 106 MMOL/L (ref 99–110)
CLARITY: CLEAR
CLARITY: CLEAR
CO2: 24 MMOL/L (ref 21–32)
COCAINE METABOLITE SCREEN URINE: POSITIVE
COLOR: YELLOW
COLOR: YELLOW
CREAT SERPL-MCNC: 1 MG/DL (ref 0.8–1.3)
EOSINOPHILS ABSOLUTE: 0.2 K/UL (ref 0–0.6)
EOSINOPHILS RELATIVE PERCENT: 4.8 %
EPITHELIAL CELLS, UA: 2 /HPF (ref 0–5)
EPITHELIAL CELLS, UA: 3 /HPF (ref 0–5)
ETHANOL: NORMAL MG/DL (ref 0–0.08)
GFR AFRICAN AMERICAN: >60
GFR NON-AFRICAN AMERICAN: >60
GLUCOSE BLD-MCNC: 150 MG/DL (ref 70–99)
GLUCOSE URINE: NEGATIVE MG/DL
GLUCOSE URINE: NEGATIVE MG/DL
HCT VFR BLD CALC: 39.4 % (ref 40.5–52.5)
HEMOGLOBIN: 12.5 G/DL (ref 13.5–17.5)
HYALINE CASTS: 1 /LPF (ref 0–8)
HYALINE CASTS: 3 /LPF (ref 0–8)
KETONES, URINE: NEGATIVE MG/DL
KETONES, URINE: NEGATIVE MG/DL
LEUKOCYTE ESTERASE, URINE: NEGATIVE
LEUKOCYTE ESTERASE, URINE: NEGATIVE
LYMPHOCYTES ABSOLUTE: 1.4 K/UL (ref 1–5.1)
LYMPHOCYTES RELATIVE PERCENT: 28.2 %
Lab: ABNORMAL
MAGNESIUM: 2 MG/DL (ref 1.8–2.4)
MCH RBC QN AUTO: 27.5 PG (ref 26–34)
MCHC RBC AUTO-ENTMCNC: 31.7 G/DL (ref 31–36)
MCV RBC AUTO: 86.6 FL (ref 80–100)
METHADONE SCREEN, URINE: ABNORMAL
MICROSCOPIC EXAMINATION: YES
MICROSCOPIC EXAMINATION: YES
MONOCYTES ABSOLUTE: 0.6 K/UL (ref 0–1.3)
MONOCYTES RELATIVE PERCENT: 11.3 %
NEUTROPHILS ABSOLUTE: 2.8 K/UL (ref 1.7–7.7)
NEUTROPHILS RELATIVE PERCENT: 55.1 %
NITRITE, URINE: NEGATIVE
NITRITE, URINE: NEGATIVE
OPIATE SCREEN URINE: ABNORMAL
OXYCODONE URINE: ABNORMAL
PDW BLD-RTO: 15.1 % (ref 12.4–15.4)
PH UA: 6
PH UA: 6 (ref 5–8)
PH UA: 6 (ref 5–8)
PHENCYCLIDINE SCREEN URINE: ABNORMAL
PLATELET # BLD: 144 K/UL (ref 135–450)
PMV BLD AUTO: 7.8 FL (ref 5–10.5)
POTASSIUM REFLEX MAGNESIUM: 3.4 MMOL/L (ref 3.5–5.1)
PRO-BNP: 161 PG/ML (ref 0–124)
PROPOXYPHENE SCREEN: ABNORMAL
PROTEIN UA: NEGATIVE MG/DL
PROTEIN UA: NEGATIVE MG/DL
RBC # BLD: 4.55 M/UL (ref 4.2–5.9)
RBC UA: 12 /HPF (ref 0–4)
RBC UA: 8 /HPF (ref 0–4)
SALICYLATE, SERUM: <0.3 MG/DL (ref 15–30)
SODIUM BLD-SCNC: 142 MMOL/L (ref 136–145)
SPECIFIC GRAVITY UA: 1.02 (ref 1–1.03)
SPECIFIC GRAVITY UA: >=1.03 (ref 1–1.03)
TOTAL CK: 133 U/L (ref 39–308)
URINE REFLEX TO CULTURE: ABNORMAL
URINE TYPE: ABNORMAL
URINE TYPE: ABNORMAL
UROBILINOGEN, URINE: 0.2 E.U./DL
UROBILINOGEN, URINE: 1 E.U./DL
WBC # BLD: 5 K/UL (ref 4–11)
WBC UA: 3 /HPF (ref 0–5)
WBC UA: 4 /HPF (ref 0–5)

## 2022-03-02 PROCEDURE — 80143 DRUG ASSAY ACETAMINOPHEN: CPT

## 2022-03-02 PROCEDURE — 83735 ASSAY OF MAGNESIUM: CPT

## 2022-03-02 PROCEDURE — 81001 URINALYSIS AUTO W/SCOPE: CPT

## 2022-03-02 PROCEDURE — 36415 COLL VENOUS BLD VENIPUNCTURE: CPT

## 2022-03-02 PROCEDURE — 80179 DRUG ASSAY SALICYLATE: CPT

## 2022-03-02 PROCEDURE — 85025 COMPLETE CBC W/AUTO DIFF WBC: CPT

## 2022-03-02 PROCEDURE — 6370000000 HC RX 637 (ALT 250 FOR IP)

## 2022-03-02 PROCEDURE — 80048 BASIC METABOLIC PNL TOTAL CA: CPT

## 2022-03-02 PROCEDURE — 71045 X-RAY EXAM CHEST 1 VIEW: CPT

## 2022-03-02 PROCEDURE — 82550 ASSAY OF CK (CPK): CPT

## 2022-03-02 PROCEDURE — 99283 EMERGENCY DEPT VISIT LOW MDM: CPT

## 2022-03-02 PROCEDURE — 80307 DRUG TEST PRSMV CHEM ANLYZR: CPT

## 2022-03-02 PROCEDURE — 82077 ASSAY SPEC XCP UR&BREATH IA: CPT

## 2022-03-02 PROCEDURE — 83880 ASSAY OF NATRIURETIC PEPTIDE: CPT

## 2022-03-02 RX ORDER — HYDROCHLOROTHIAZIDE 25 MG/1
50 TABLET ORAL ONCE
Status: COMPLETED | OUTPATIENT
Start: 2022-03-02 | End: 2022-03-02

## 2022-03-02 RX ORDER — LISINOPRIL 10 MG/1
20 TABLET ORAL ONCE
Status: COMPLETED | OUTPATIENT
Start: 2022-03-02 | End: 2022-03-02

## 2022-03-02 RX ORDER — ACETAMINOPHEN 500 MG
1000 TABLET ORAL ONCE
Status: COMPLETED | OUTPATIENT
Start: 2022-03-02 | End: 2022-03-02

## 2022-03-02 RX ADMIN — HYDROCHLOROTHIAZIDE 50 MG: 25 TABLET ORAL at 12:28

## 2022-03-02 RX ADMIN — ACETAMINOPHEN 1000 MG: 500 TABLET ORAL at 12:28

## 2022-03-02 RX ADMIN — LISINOPRIL 20 MG: 10 TABLET ORAL at 12:28

## 2022-03-02 ASSESSMENT — PAIN SCALES - GENERAL
PAINLEVEL_OUTOF10: 5
PAINLEVEL_OUTOF10: 8
PAINLEVEL_OUTOF10: 5

## 2022-03-02 ASSESSMENT — ENCOUNTER SYMPTOMS
RHINORRHEA: 0
COUGH: 0
VOMITING: 0
ABDOMINAL PAIN: 0
SHORTNESS OF BREATH: 0
CONSTIPATION: 0
BACK PAIN: 0
EYE PAIN: 0
SORE THROAT: 0
DIARRHEA: 1
NAUSEA: 0

## 2022-03-02 ASSESSMENT — PAIN DESCRIPTION - DESCRIPTORS: DESCRIPTORS: ACHING

## 2022-03-02 ASSESSMENT — PAIN DESCRIPTION - PROGRESSION: CLINICAL_PROGRESSION: NOT CHANGED

## 2022-03-02 ASSESSMENT — PAIN DESCRIPTION - ORIENTATION: ORIENTATION: OTHER (COMMENT)

## 2022-03-02 ASSESSMENT — PAIN DESCRIPTION - ONSET: ONSET: ON-GOING

## 2022-03-02 ASSESSMENT — PAIN DESCRIPTION - FREQUENCY: FREQUENCY: CONTINUOUS

## 2022-03-02 ASSESSMENT — PAIN DESCRIPTION - LOCATION: LOCATION: GENERALIZED

## 2022-03-02 ASSESSMENT — PAIN DESCRIPTION - PAIN TYPE: TYPE: ACUTE PAIN

## 2022-03-02 NOTE — ED PROVIDER NOTES
I have personally performed a face to face diagnostic evaluation on this patient. I have fully participated in the care of this patient I personally saw the patient and performed a substantive portion of the visit including all aspects of the medical decision making. I have reviewed and agree with all pertinent clinical information including history, physical exam, diagnostic tests, and the plan. HPI: Taryn Peralta presented with lateral leg pain. Unknown clear when the pain started. 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 Patient is not regularly follow-up with vascular. Patient states he lives in [facility] and has been to multiple hospitals for similar complaints but decided to come here because this is where he had his care before. Patient was seen earlier this morning for similar complaints. Chief Complaint   Patient presents with    Leg Pain     bilateral leg pain. pt states he doesnt know when pain started and \"just needs to see the doctor\"       Review of Systems: See MILKA note  Vital Signs: BP (!) 182/101   Pulse 70   Temp 98.1 °F (36.7 °C) (Oral)   Resp 16   Wt 216 lb 7.9 oz (98.2 kg)   SpO2 97%   BMI 33.91 kg/m²     Alert 78 y.o. male who does not appear toxic or acutely ill  HENT: Atraumatic, oral mucosa moist  Neck: Grossly normal ROM  Chest/Lungs: respiratory effort normal   Abdomen: Soft nontender  Extremities: Palpable pulses on the left foot no palpable pulse on the right foot but found with ultrasound Doppler  Musculoskeletal: Grossly normal ROM  Skin: No palor or diaphoresis    Medical Decision Making and Plan:  Pertinent Labs & Imaging studies reviewed. (See MILKA chart for details)  I agree with MILKA assessment and plan. Patient is a 59-year-old male with history of multiple psychiatric disorders including schizophrenia, alcohol abuse, cocaine abuse. He has a history of chronic mesenteric ischemia as well as previous SMA thrombosis.  He is complaining of acute on chronic leg pain. He has palpable pulses on the left and pulses were able to be dopplered with ultrasound on the right. Patient was recently seen earlier this morning by partner Dr. Judy Mason and had lab work that was relatively unremarkable. Patient has chronic pain. No signs of acute ischemia at this time. His feet are warm well-perfused good cap refill. Will give patient pain control and instructions to follow-up with vascular surgery however it does appear the patient has some secondary gain he is primarily here to be able to use a phone as well as get some food prior to him leaving to go back up to [place]. Will discharge patient with strict return precautions for any new or worsening symptoms.  See MILKA note for further details          Marvin De Santiago MD  03/02/22 8156

## 2022-03-02 NOTE — ED NOTES
Pt refusing to have repeat BP done. ED provider aware. Patient claiming he needs to stay at hospital overnight because he needs to have a government check sent overnight to hospital.  Patient claims to be from [place] and rode a Greyhound here because he was seen here 4 years ago and received \"stents\" to save his life so he came back. Patient states he has no money to get a greyhound back to [place]. ED provider aware and social work requested to see patient.      Vikram Vital RN  03/02/22 460 Natan Murillo RN  03/02/22 8521

## 2022-03-02 NOTE — ED PROVIDER NOTES
[address]        Pt Name: Andi Tirado  MRN: [id number]  [place] 1955  Date of evaluation: 3/2/2022  Provider: EL Finnegan - CNP  PCP: Arlet Meneses MD  Note Started: 12:05 PM EST       I have seen and evaluated this patient with my supervising physician No att. providers found. Triage CHIEF COMPLAINT       Chief Complaint   Patient presents with    Leg Pain     bilateral leg pain. pt states he doesnt know when pain started and \"just needs to see the doctor\"          HISTORY OF PRESENT ILLNESS   (Location/Symptom, Timing/Onset, Context/Setting, Quality, Duration, Modifying Factors, Severity)  Note limiting factors. Andi Tirado is a 78 y.o. male who presents to the ED reporting multiple complaints. The patient was recently seen in the ER, several hours ago, for crack cocaine abuse with bilateral leg swelling. Patient is a relatively poor historian, is quite irritable and appears to be unkempt. His clothes are stained with urine in the front. He is wearing quite a thin jacket for the weather. 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 This was the only hospital that could do the surgery, not [place] nobody else. I know I have the same problem in my leg and I want those surgeons to fix my leg\". Patient says the pain has been going on for a while he estimates about 2 weeks. He says he is not taking any of his medications as he has been out. He tells me he got on a bus from [facility] to come to this hospital for his legs. Patient denies any SI or HI or hallucinations at this time. He does endorse crack cocaine use recently but is unable to tell me exactly when. Nursing Notes were all reviewed and agreed with or any disagreements were addressed in the HPI.     REVIEW OF SYSTEMS    (2-9 systems for level 4, 10 or more for level 5)     Review of Systems   Constitutional: Negative for chills, diaphoresis and fever. HENT: Negative for congestion, rhinorrhea and sore throat. Eyes: Negative for pain and visual disturbance. Respiratory: Negative for cough and shortness of breath. Cardiovascular: Positive for leg swelling. Negative for chest pain. Gastrointestinal: Positive for diarrhea. Negative for abdominal pain, constipation, nausea and vomiting. Genitourinary: Positive for frequency. Negative for hematuria and urgency. Musculoskeletal: Negative for back pain and neck pain. Skin: Negative for rash and wound. Neurological: Negative for dizziness and light-headedness. PAST MEDICAL HISTORY     Past Medical History:   Diagnosis Date    Arthritis     degenerative    Hypertension     Neuromuscular disorder ([facility]     numbness left foot    PTSD (post-traumatic stress disorder)     Schizophrenia ([facility]     Social anxiety disorder        SURGICAL HISTORY     Past Surgical History:   Procedure Laterality Date    EYE SURGERY Right        CURRENTMEDICATIONS       Discharge Medication List as of 3/2/2022  3:19 PM      CONTINUE these medications which have NOT CHANGED    Details   ondansetron (ZOFRAN ODT) 4 MG disintegrating tablet Take 1 tablet by mouth 4 times daily as needed for Nausea or Vomiting, Disp-30 tablet, R-1Print      omeprazole (PRILOSEC) 40 MG delayed release capsule Take 1 capsule by mouth every morning (before breakfast), Disp-30 capsule, R-0Print      aspirin 81 MG chewable tablet Take 1 tablet by mouth daily. , Disp-30 tablet, R-3      clopidogrel (PLAVIX) 75 MG tablet Take 1 tablet by mouth daily. , Disp-30 tablet, R-3      lisinopril-hydrochlorothiazide (PRINZIDE;ZESTORETIC) 20-12.5 MG per tablet Take 1 tablet by mouth daily. ALLERGIES     Aspirin    FAMILYHISTORY     No family history on file.      SOCIAL HISTORY       Social History     Socioeconomic History  Marital status: Single     Spouse name: Not on file    Number of children: Not on file    Years of education: Not on file    Highest education level: Not on file   Occupational History    Not on file   Tobacco Use    Smoking status: Current Every Day Smoker     Packs/day: 1.00     Years: 14.00     Pack years: 14.00     Types: Cigarettes    Smokeless tobacco: Not on file   Substance and Sexual Activity    Alcohol use: Yes     Comment: rarely, but had alcohol on Wednesday.  Drug use: Yes     Types: Cocaine     Comment: used Cocaine last week.  Sexual activity: Not on file   Other Topics Concern    Not on file   Social History Narrative    Not on file     Social Determinants of Health     Financial Resource Strain:     Difficulty of Paying Living Expenses: Not on file   Food Insecurity:     Worried About Running Out of Food in the Last Year: Not on file    Wilmer of Food in the Last Year: Not on file   Transportation Needs:     Lack of Transportation (Medical): Not on file    Lack of Transportation (Non-Medical):  Not on file   Physical Activity:     Days of Exercise per Week: Not on file    Minutes of Exercise per Session: Not on file   Stress:     Feeling of Stress : Not on file   Social Connections:     Frequency of Communication with Friends and Family: Not on file    Frequency of Social Gatherings with Friends and Family: Not on file    Attends Cheondoism Services: Not on file    Active Member of [address] [place] [address] or Organizations: Not on file    Attends Club or Organization Meetings: Not on file    Marital Status: Not on file   Intimate Partner Violence:     Fear of Current or Ex-Partner: Not on file    Emotionally Abused: Not on file    Physically Abused: Not on file    Sexually Abused: Not on file   Housing Stability:     Unable to Pay for Housing in the Last Year: Not on file    Number of [place] in the Last Year: Not on file    Unstable Housing in the Last Year: Not on file SCREENINGS    Karen Coma Scale  Eye Opening: Spontaneous  Best Verbal Response: Oriented  Best Motor Response: Obeys commands  Karen Coma Scale Score: 15        PHYSICAL EXAM    (up to 7 for level 4, 8 or more for level 5)     ED Triage Vitals   BP Temp Temp Source Pulse Resp SpO2 Height Weight   03/02/22 1140 03/02/22 1140 03/02/22 1140 03/02/22 1140 03/02/22 1140 03/02/22 1140 -- 03/02/22 1138   (!) 182/101 98.1 °F (36.7 °C) Oral 70 16 97 %  216 lb 7.9 oz (98.2 kg)       Physical Exam  Vitals and nursing note reviewed. Constitutional:       Appearance: He is not ill-appearing or diaphoretic. Comments: Poor grooming, dried urine on front of pants. Arrives wearing hospital socks. HENT:      Head: Normocephalic and atraumatic. Nose: Nose normal. No congestion. Mouth/Throat:      Mouth: Mucous membranes are moist.   Eyes:      General:         Right eye: No discharge. Left eye: No discharge. Pupils: Pupils are equal, round, and reactive to light. Cardiovascular:      Rate and Rhythm: Normal rate and regular rhythm. Pulses:           Radial pulses are 2+ on the right side and 2+ on the left side. Dorsalis pedis pulses are detected w/ Doppler on the right side. Posterior tibial pulses are detected w/ Doppler on the right side. Heart sounds: Normal heart sounds. Comments: I was able to palpate DP and PT pulses on the left side. DP and PT were unpalpable on the right side however there were visualized via ultrasound by my attending Dr. Chaitanya Israel. Please see his note for details  Pulmonary:      Effort: Pulmonary effort is normal. No respiratory distress. Abdominal:      General: Abdomen is flat. There is no distension. Musculoskeletal:         General: Normal range of motion. Cervical back: Normal range of motion and neck supple. No rigidity. Right lower leg: Edema present. Left lower leg: Edema present.    Skin:     General: Skin is warm and dry. Capillary Refill: Capillary refill takes less than 2 seconds. Neurological:      General: No focal deficit present. Mental Status: He is alert and oriented to person, place, and time. Psychiatric:         Mood and Affect: Affect is labile and angry. Behavior: Behavior is uncooperative, agitated and aggressive. Thought Content: Thought content does not include homicidal or suicidal ideation. Thought content does not include homicidal or suicidal plan. PE quite challenging as patient is a little irritable. He insists that he needs to see the same surgeon who operated in place stents in his abdominal vasculature. He consents to a limited exam.    DIAGNOSTIC RESULTS   LABS:    Labs Reviewed   URINALYSIS - Abnormal; Notable for the following components:       Result Value    Blood, Urine MODERATE (*)     All other components within normal limits   MICROSCOPIC URINALYSIS - Abnormal; Notable for the following components:    RBC, UA 8 (*)     All other components within normal limits       When ordered, only abnormal lab results are displayed. All other labs were within normal range or not returned as of this dictation. EKG: When ordered, EKG's are interpreted by the Emergency Department Physician in the absence of a cardiologist.  Please see their note for interpretation of EKG. RADIOLOGY:   Non-plain film images such as CT, Ultrasound and MRI are read by the radiologist. Plain radiographic images are visualized andpreliminarily interpreted by the  ED Provider with the below findings:        Interpretation perthe Radiologist below, if available at the time of this note:    No orders to display     XR CHEST PORTABLE    Result Date: 3/2/2022  EXAMINATION: [address] [place] [address] 3/2/2022 3:26 am COMPARISON: None available.  HISTORY: ORDERING SYSTEM PROVIDED HISTORY: SOB TECHNOLOGIST PROVIDED HISTORY: Reason for exam:->SOB Reason for Exam: Leg Swelling; [phone]    Call in 2 days  To follow up on your urinary concerns, Follow up on your recent ED visit      DISCHARGE MEDICATIONS:  Discharge Medication List as of 3/2/2022  3:19 PM          DISCONTINUED MEDICATIONS:  Discharge Medication List as of 3/2/2022  3:19 PM                 (Please note that portions ofthis note were completed with a voice recognition program.  Efforts were made to edit the dictations but occasionally words are mis-transcribed.)    EL Grimaldo CNP (electronically signed)              EL Grimaldo CNP  03/02/22 8571

## 2022-03-02 NOTE — ED NOTES
ER tech x2 lab draw attempts. Unsuccessful. Patient not allowing any more attempts. Patient states,\" just use the labs from yesterday. \" provider notified. Alisha Chiu CNP aware and notified.       [place] Smoker, RN  03/02/22 9771

## 2022-03-02 NOTE — ED PROVIDER NOTES
[address] [facility]      Pt Name: Ranjith Vuong  MRN: [id number]  [place] 1955  Date of evaluation: 3/2/2022  Provider: Davis Muro MD    [address] [place] [address]       Chief Complaint   Patient presents with    Leg Swelling     Pt reports bilateral leg swelling, body aches, and issues with urination for the \"last couple of weeks\". Pt alert and oriented at this time and complaining of \"pain all over\". Pt rates pain as a 8/10 at this time.  Generalized Body Aches       HISTORY OF PRESENT ILLNESS    Ranjith Vuong is a 78 y.o. male who presents to the emergency department with cocaine abuse. Patient presents with multiple complaints. Patient does cocaine daily he says. States he has had some bilateral leg swelling this been going on for years as well as body aches and also has been going on for years. States it is related to his cocaine usage. States nobody can seem to figure out why he has this leg swelling and body aches. Has seen multiple doctors. Was told to stop using cocaine but has no intention of stopping. Also has a history of alcohol usage. Endorses 10 out of 10 pain everywhere. Described as achy. No shortness of breath. No chest pain. Symptoms have been going on for years but worsening within the last few weeks. No other associated symptoms. Nursing Notes were reviewed. Including nursing noted for FM, Surgical History, Past Medical History, Social History, vitals, and allergies; agree with all. REVIEW OF SYSTEMS       Review of Systems    Except as noted above the remainder of the review of systems was reviewed and negative.      PAST MEDICAL HISTORY     Past Medical History:   Diagnosis Date    Arthritis     degenerative    Hypertension     Neuromuscular disorder (HCC)     numbness left foot    PTSD (post-traumatic stress disorder)     Schizophrenia (HCC)     Social anxiety disorder        SURGICAL HISTORY       Past Surgical History:   Procedure Laterality Date    EYE SURGERY Right        CURRENT MEDICATIONS       Previous Medications    ASPIRIN 81 MG CHEWABLE TABLET    Take 1 tablet by mouth daily. CLOPIDOGREL (PLAVIX) 75 MG TABLET    Take 1 tablet by mouth daily. LISINOPRIL-HYDROCHLOROTHIAZIDE (PRINZIDE;ZESTORETIC) 20-12.5 MG PER TABLET    Take 1 tablet by mouth daily. OMEPRAZOLE (PRILOSEC) 40 MG DELAYED RELEASE CAPSULE    Take 1 capsule by mouth every morning (before breakfast)    ONDANSETRON (ZOFRAN ODT) 4 MG DISINTEGRATING TABLET    Take 1 tablet by mouth 4 times daily as needed for Nausea or Vomiting       ALLERGIES     Aspirin    FAMILY HISTORY      History reviewed. No pertinent family history. SOCIAL HISTORY       Social History     Socioeconomic History    Marital status: Single     Spouse name: None    Number of children: None    Years of education: None    Highest education level: None   Occupational History    None   Tobacco Use    Smoking status: Current Every Day Smoker     Packs/day: 1.00     Years: 14.00     Pack years: 14.00     Types: Cigarettes    Smokeless tobacco: None   Substance and Sexual Activity    Alcohol use: Yes     Comment: rarely, but had alcohol on Wednesday.  Drug use: Yes     Types: Cocaine     Comment: used Cocaine last week.  Sexual activity: None   Other Topics Concern    None   Social History Narrative    None     Social Determinants of Health     Financial Resource Strain:     Difficulty of Paying Living Expenses: Not on file   Food Insecurity:     Worried About Running Out of Food in the Last Year: Not on file    Wilmer of Food in the Last Year: Not on file   Transportation Needs:     Lack of Transportation (Medical): Not on file    Lack of Transportation (Non-Medical):  Not on file   Physical Activity:     Days of Exercise per Week: Not on file    Minutes of Exercise per Session: Not on file   Stress:     Feeling of Stress : Not on file   Social Connections:     Frequency of Communication with Friends and Family: Not on file    Frequency of Social Gatherings with Friends and Family: Not on file    Attends Amish Services: Not on file    Active Member of Clubs or Organizations: Not on file    Attends Club or Organization Meetings: Not on file    Marital Status: Not on file   Intimate Partner Violence:     Fear of Current or Ex-Partner: Not on file    Emotionally Abused: Not on file    Physically Abused: Not on file    Sexually Abused: Not on file   Housing Stability:     Unable to Pay for Housing in the Last Year: Not on file    Number of [place] in the Last Year: Not on file    Unstable Housing in the Last Year: Not on file       PHYSICAL EXAM       ED Triage Vitals [03/02/22 0300]   BP Temp Temp Source Pulse Resp SpO2 Height Weight   (!) 170/105 98 °F (36.7 °C) Oral 88 18 99 % -- --       Physical Exam  Vitals and nursing note reviewed. Constitutional:       General: He is not in acute distress. Appearance: He is well-developed. He is not diaphoretic. HENT:      Head: Normocephalic and atraumatic. Eyes:      General:         Right eye: No discharge. Left eye: No discharge. Pupils: Pupils are equal, round, and reactive to light. Neck:      Thyroid: No thyromegaly. Trachea: No tracheal deviation. Cardiovascular:      Rate and Rhythm: Normal rate and regular rhythm. Heart sounds: No murmur heard. Pulmonary:      Breath sounds: No wheezing or rales. Chest:      Chest wall: No tenderness. Abdominal:      General: There is no distension. Palpations: Abdomen is soft. There is no mass. Tenderness: There is no abdominal tenderness. There is no guarding or rebound. Musculoskeletal:         General: No tenderness or deformity. Normal range of motion. Cervical back: Normal range of motion. Right lower leg: Edema present. Left lower leg: Edema present.    Skin:     General: Skin is warm. Coloration: Skin is not pale. Findings: No erythema or rash. Neurological:      Mental Status: He is alert. Cranial Nerves: No cranial nerve deficit. Motor: No abnormal muscle tone. Coordination: Coordination normal.         DIAGNOSTIC RESULTS     RADIOLOGY:   Non-plain film images such as CT, Ultrasoundand MRI are read by the radiologist. Plain radiographic images are visualized and preliminarily interpreted by the emergency physician with the below findings:    Impression   1. No acute cardiopulmonary disease. ED BEDSIDE ULTRASOUND:   Performed by ED Physician - none    LABS:  Labs Reviewed - No data to display    All other labs were withinnormal range or not returned as of this dictation. EMERGENCY DEPARTMENT COURSE and DIFFERENTIAL DIAGNOSIS/MDM:     PMH, Surgical Hx, FH, Social Hx reviewed by myself (ETOH usage, Tobacco usage, Drug usage reviewed by myself, no pertinent Hx)- No Pertinent Hx     Old records were reviewed by me    79-year-old with cocaine abuse. Instructed him to stop doing cocaine. No chest pain or shortness of breath. Chronic problems. Has been seen in multiple emergency rooms for similar complaints. Denies SI, HI, hallucinations. Strict outpatient follow-up. I estimate there is LOW risk for Sepsis, MI, Stroke, Tamponade, PTX, Toxicity or other life threatening etiology thus I consider the discharge disposition reasonable. The patient is at low risk for mortality based on demographic, history and clinical factors. Given the best available information and clinical assessment, I estimate the risk of hospitalization to be greater than risk of treatment at home. I have explained to the patient that the risk could rapidly change, given precautions for return and instructions. Explained to patient that the risk for mortality is low based on demographic, history and clinical factors.      I discussed with patient the results of evaluation in the ED, diagnosis, care, and prognosis. The plan is to discharge to home. Patient is in agreement with plan and questions have been answered. I also discussed with patient the reasons which may require a return visit and the importance of follow-up care. The patient is well-appearing, nontoxic, and improved at the time of discharge. Patient agrees to call to arrange follow-up care as directed. Patient understands to return immediately for worsening/change in symptoms. CRITICAL CARE TIME   Total Critical Caretime was 21 minutes, excluding separately reportable procedures. There was a high probability of clinically significant/life threatening deterioration in the patient's condition which required my urgent intervention. PROCEDURES:  Unlessotherwise noted below, none    FINAL IMPRESSION      1.  Nondependent cocaine abuse [facility])          DISPOSITION/PLAN   DISPOSITION      PATIENT REFERRED TO:  Deena Apley, MD  [address] [place] [address]  [phone]    Call today        DISCHARGE MEDICATIONS:  New Prescriptions    No medications on file          (Please note that portions ofthis note were completed with a voice recognition program.  Efforts were made to edit the dictations but occasionally words are mis-transcribed.)    Monica Mg MD(electronically signed)  Attending Emergency Physician            Monica Mg MD  03/02/22 0074

## 2022-03-02 NOTE — CARE COORDINATION
Pt needed ANGEL pradhan, to [address] [facility] to meet with  to help him return to his home in [place]. ANGEL pradhan provided.

## 2022-03-02 NOTE — ED NOTES
While this RN was doing a task in another room, patient was seen leaving room with belongings. No paperwork signed or given.        Zoe Villalobos RN  03/02/22 2829